# Patient Record
Sex: FEMALE | Race: WHITE | URBAN - METROPOLITAN AREA
[De-identification: names, ages, dates, MRNs, and addresses within clinical notes are randomized per-mention and may not be internally consistent; named-entity substitution may affect disease eponyms.]

---

## 2021-02-02 ENCOUNTER — HISTORICAL (OUTPATIENT)
Dept: ADMINISTRATIVE | Facility: HOSPITAL | Age: 26
End: 2021-02-02

## 2021-02-02 LAB — B-HCG FREE SERPL-ACNC: <2.42 MIU/ML

## 2024-10-09 DIAGNOSIS — M25.562 LEFT KNEE PAIN: Primary | ICD-10-CM

## 2024-11-07 ENCOUNTER — HOSPITAL ENCOUNTER (OUTPATIENT)
Dept: RADIOLOGY | Facility: HOSPITAL | Age: 29
Discharge: HOME | End: 2024-11-07
Attending: NURSE PRACTITIONER
Payer: MEDICAID

## 2024-11-07 ENCOUNTER — OFFICE VISIT (OUTPATIENT)
Dept: ORTHOPEDICS | Facility: CLINIC | Age: 29
End: 2024-11-07
Payer: MEDICAID

## 2024-11-07 VITALS
SYSTOLIC BLOOD PRESSURE: 116 MMHG | HEIGHT: 63 IN | TEMPERATURE: 98 F | BODY MASS INDEX: 22.19 KG/M2 | HEART RATE: 62 BPM | DIASTOLIC BLOOD PRESSURE: 72 MMHG | WEIGHT: 125.25 LBS

## 2024-11-07 DIAGNOSIS — M23.92 ACUTE INTERNAL DERANGEMENT OF LEFT KNEE: Primary | ICD-10-CM

## 2024-11-07 DIAGNOSIS — M62.552 ATROPHY OF QUADRICEPS FEMORIS MUSCLE OF LEFT THIGH: ICD-10-CM

## 2024-11-07 DIAGNOSIS — M25.562 ACUTE PAIN OF LEFT KNEE: ICD-10-CM

## 2024-11-07 PROCEDURE — 1160F RVW MEDS BY RX/DR IN RCRD: CPT | Mod: CPTII,,, | Performed by: NURSE PRACTITIONER

## 2024-11-07 PROCEDURE — 99205 OFFICE O/P NEW HI 60 MIN: CPT | Mod: S$PBB,,, | Performed by: NURSE PRACTITIONER

## 2024-11-07 PROCEDURE — 3078F DIAST BP <80 MM HG: CPT | Mod: CPTII,,, | Performed by: NURSE PRACTITIONER

## 2024-11-07 PROCEDURE — 3074F SYST BP LT 130 MM HG: CPT | Mod: CPTII,,, | Performed by: NURSE PRACTITIONER

## 2024-11-07 PROCEDURE — 73564 X-RAY EXAM KNEE 4 OR MORE: CPT | Mod: TC,LT

## 2024-11-07 PROCEDURE — 3008F BODY MASS INDEX DOCD: CPT | Mod: CPTII,,, | Performed by: NURSE PRACTITIONER

## 2024-11-07 PROCEDURE — 1159F MED LIST DOCD IN RCRD: CPT | Mod: CPTII,,, | Performed by: NURSE PRACTITIONER

## 2024-11-07 PROCEDURE — 99214 OFFICE O/P EST MOD 30 MIN: CPT | Mod: PBBFAC,25 | Performed by: NURSE PRACTITIONER

## 2024-11-07 RX ORDER — DICLOFENAC SODIUM 10 MG/G
4 GEL TOPICAL 3 TIMES DAILY
Qty: 100 G | Refills: 2 | Status: SHIPPED | OUTPATIENT
Start: 2024-11-07 | End: 2024-12-07

## 2024-11-07 NOTE — PROGRESS NOTES
"   [facility]  Subjective:   PATIENT ID: Karissa Higgins is a 29 y.o. female.   CHIEF COMPLAINT: Knee Pain of the Left Knee (Referred for Lt Knee pain . Pain Level 7. Pt says Its effecting the way she walks. Also C/o swelling down To ankle . Pt currently in PT.)    HPI:  Left knee pain medial stiffness and aching   Injury/ Surgical HX r/t CC:  Horse playing felt a pop anterior/ lateral knee with swelling, no brusing , inability to bear weight x 3 weeks    Onset: 7/21/24 fluctuates    Modifying Factors: exacerbated by:  increased activity, prolonged sitting, prolonged walking/ standing improved with:  movement in less than 30 minutes , rest   Associated Symptoms:  [x] joint locking  [x] joint catching  [x] decreased ROM  [x] crepitus [x] Weakness/ "giving out"  [] falls  [x] swelling  [x] difficult sleeping s/t pain        Activity: sedentary with light activity and pain moderately interferes with ADLs, assistive device none   Previous Treatments:  [x] HEP > 6 weeks  [x] RX PT 12 wks/ 3xs per week, completed n/a (currently in PT)   [x] Hinged brace since 7/31/24 at all times  [] Soft knee splint [x] OTC Pain Relievers: Tylenol, ibuprofen  [x] RX Medications: ibuprofen , po diclofenac, Tramadol, Norco, cyclobenzaprine, methocarbamol, Toradol   [x] CSI since 2024, last injection 10/2/24 (LOS) no relief at all  [] Hyaluronic acid injections   PMH:  non-smoker   Family History: + OA   NOTE:  New patient referred for left knee pain with noted history of some conservative treatments.  Symptoms affecting ADLs.  Reports feels like knee is "unstable." Denies back pain.  Has been under care of previous ortho provider and seeking 2nd opinion due to extended treatments without relief and patient is frustrated with lack of improvement.  Employment HX: nurse, currently employed.     No current outpatient medications on file.  Review of patient's allergies indicates: " "  Allergen Reactions    Clindamycin Rash    Penicillins Rash    Vancomycin analogues Rash    Zosyn in dextrose (iso-osm) [piperacillin-tazobactam-dextrs] Rash     REVIEW OF SYSTEMS:  A ten-point review of systems was performed and is negative, except as mentioned above   Objective:   Body mass index is 22.18 kg/m².   Vitals:    11/07/24 1041 11/07/24 1043   BP: 116/72    Pulse: 62    Temp: 98.4 °F (36.9 °C)    TempSrc: Oral    Weight: 56.8 kg (125 lb 3.5 oz)    Height: 5' 3" (1.6 m)    PainSc:    7     MSK Knee Exam  General:  no apparent distress, no pain indicators,  obesity  Inspection: lower extremities in proportion with overall body habitus, no erythema   ,  no erythema, limping gait w/ full weight full  LEFT KNEE RIGHT KNEE   [x] Swelling mild  [x] Varus deformity  [] Rash [] Contusion  [] Mass  [] Scars [] Swelling  [] Varus deformity  [] Rash [] Contusion  [] Mass  [] Scars   Palpation:     LEFT KNEE RIGHT KNEE   Joint Warmth: normal  POMT: diffuse pain    [] Patellar grind with compression  [] Crepitus  [] Joint effusion  [x] Quad atrophy  [] Active mechanical locking with joint movement  [] Popliteal fullness  [x] Tenderness medial joint line  [x] Tenderness lateral joint line  [x] Tenderness of tibial plateau   [x] Tenderness of patellar tendon  [x] Tenderness of quadriceps tendon  [x] Tenderness of prepatellar   [x] Tenderness of infrapatellar  [x] Tenderness of anserine bursae  [x] Tenderness of patellar facet  [x] Tenderness over lateral retinaculum Joint Warmth: normal  POMT: none  [] Patellar grind with compression  [] Crepitus  [] Joint effusion  [] Quad atrophy  [] Active mechanical locking with joint movement  [] Popliteal fullness  [] Tenderness medial joint line  [] Tenderness lateral joint line  [] Tenderness of tibial plateau   [] Tenderness of patellar tendon  [] Tenderness of quadriceps tendon  [] Tenderness of prepatellar  [] Tenderness of infrapatellar  [] Tenderness of anserine " "bursae  [] Tenderness of patellar facet  [] Tenderness over lateral retinaculum   ROM Active Flexion / Extension (0-140)  Left 1 / 114 w/ pain Right 0 / 130 w/o pain   Strength Flexion / Extension (5 / 5)  Left 3 / 5 Right 5 / 5     Special Testing:         Not  Tested IT Band Syndrome L+ L-- R+ R--    [] Fernando's Test [x] [] [] [x]     Joint Effusion        [] Ballotable Effusion [] [x] [] [x]    [] Fluid Wave [] [x] [] [x]     Patellar Testing        [] Apprehension [x] [] [] [x]     Meniscal Injury        [] Luis Fernando's Test [x] [] [] [x]    [x] Thessaly's Test [] [] [] []     Ligament Injury        [] ACL Anterior Drawer [] [x] [] [x]    [] PCL Posterior Drawer [] [x] [] [x]    [] LCL Varus Test [] [x] [] [x]    [] MCL Valgus Test [] [x] [] [x]      Hip Exam normal  Ankle Exam normal  Neurovascular: Intact to light touch  Neuro/ Psych: Awake, alert, oriented, normal mood and affect  Lymphatic: No LAD  Skin/ Soft Tissue: no rash, skin intact  Cardio: no edema, vascular integrity noted   Assessment:   IMAGING:  XR Ordered by me today 4 views of left knee reviewed and independently interpreted by me with noted no acute findings noted, findings suggestive of arthritic changes, medial joint space narrowing.  Awaiting radiologist findings.  Findings discussed with patient today.    MRI/CT:   Dated 7/26/24 by [facility]:  Minimal increased T2 signal/edema, perhaps representing minimal contusion along the posterior aspect of the medial tibial plateau.  Diffuse thinning of ACL fibers which is largely chronic although there may be some superimposed strain.  Focal soft tissue swelling/edema over the medial aspect of the distal joint capsule.    LABS: No results found for: "HGBA1C"  EMR REVIEW: completed with noted Referral documentation reviewed  Diagnosis & Treatment Plan:   DIAGNOSIS: Acute on Chronic  Left  knee ACL sprain (chronic) with possible internal derangement with mechanical locking complicated by pan-positive " testing and examination with quad atrophy and gait dysfunction from prolonged hinged bracing despite 12 weeks of RX PT from previous ortho provider.     1. Acute internal derangement of left knee    2. Atrophy of quadriceps femoris muscle of left thigh    3. BMI 22.0-22.9, adult      TREATMENT PLAN:  Orders Placed This Encounter    HME - OTHER    X-Ray Knee Complete 4 or More Views Left     Treatment plan:    Extensive time spent reviewing previous MRI results and carefully explaining findings to ensure patient understands DX, treatment plan and need for improvement in secondary symptoms like quad atrophy and gait dysfunction in order to improve symptoms.  Verbalized understanding, agrees to plan and denies questions.   MRI ordered s/t failed conservative treatments including: RX NSAID, formal RX PT, CSI, and HEP > 3 months with inadequate relief.  Patient continues with findings suggestive of meniscal injury with mechanical locking despite > 6 weeks treatment.  MRI required for definitive DX and possible surgical treatment plans.   RX PT  with controlled, progressive tendon loading and gradual resumption of activity, instructed to contact insurance provider in order to obtain provider which takes patient's insurance.  Patient encouraged to find new PT provider if current provider not making progress with LE strength.   Recommended patient wean out of hinged knee brace into soft knee splint over 7-1o days.   Ongoing education about DX, treatment recommendations and reasonable expectations including goal to decrease pain and increase function  Conservative treatments including, but limited to:  activity modification as needed, daily HEP with TheraBand, proper supportive footwear, non-impact muscle strengthening with use of stationary bike (RPM set at 80 or > with slow progression to goal of 40 minutes 3-4 times per week as tolerated), walking-aides as needed, adequate vit D/C, glucosamine 1500 mg/day and/or daily  acetaminophen 1000 mg 3 times/ day if able to tolerate.    Weight management is paramount. Immediate BMI reduction goal 5-10% of body weight.  Achieving a BMI < 25 would be optimal for overall health and symptoms relief.   Patient aware condition has no cure and treatment plan is focused on management of symptoms.  Procedure: n/a  RX Medications: continue medications as RX per PCP.  RX topical diclofenac, medication precautions given.   RTC:  after imaging complete  NOTE: None    Leah Menard-Neumann FNP [facility]  Procedure Note:   None  Time Based Billing   Total Time Spent with Patient: 60 minutes Excludes Time Spent Performing Procedure  Visit Start Time: 1130  10 minutes spent prior to visit reviewing EMR, prior labs and x-rays  35 minutes spent in visit with patient face-to-face time completing exam, obtaining history, educating on DX and treatment plan.  15 minutes spent after visit completing EMR documentation.   Visit End Time: 1230     *Please be aware that this note has been generated with the assistance of MModal voice-to-text.  Please excuse any spelling or grammatical errors. Positive findings indicted by checkmark*

## 2024-11-25 ENCOUNTER — TELEPHONE (OUTPATIENT)
Dept: ORTHOPEDICS | Facility: CLINIC | Age: 29
End: 2024-11-25
Payer: MEDICAID

## 2024-11-25 NOTE — TELEPHONE ENCOUNTER
----- Message from Guadalupe Ayers NP sent at 11/25/2024  8:30 AM CST -----  MRI reviewed, schedule on Monday 12/2/24 for telemedicine visit for MRI results. Thanks

## 2024-12-02 ENCOUNTER — OFFICE VISIT (OUTPATIENT)
Dept: ORTHOPEDICS | Facility: CLINIC | Age: 29
End: 2024-12-02
Payer: MEDICAID

## 2024-12-02 VITALS — HEIGHT: 63 IN | BODY MASS INDEX: 23.92 KG/M2 | WEIGHT: 135 LBS

## 2024-12-02 DIAGNOSIS — M62.552 ATROPHY OF QUADRICEPS FEMORIS MUSCLE OF LEFT THIGH: Primary | ICD-10-CM

## 2024-12-02 PROCEDURE — 1159F MED LIST DOCD IN RCRD: CPT | Mod: CPTII,95,, | Performed by: NURSE PRACTITIONER

## 2024-12-02 PROCEDURE — 99214 OFFICE O/P EST MOD 30 MIN: CPT | Mod: 95,,, | Performed by: NURSE PRACTITIONER

## 2024-12-02 PROCEDURE — 1160F RVW MEDS BY RX/DR IN RCRD: CPT | Mod: CPTII,95,, | Performed by: NURSE PRACTITIONER

## 2024-12-02 PROCEDURE — 3008F BODY MASS INDEX DOCD: CPT | Mod: CPTII,95,, | Performed by: NURSE PRACTITIONER

## 2024-12-02 NOTE — PROGRESS NOTES
"   [facility]  Subjective:   Telemedicine Consent  The patient location is: Home  The chief complaint leading to consultation is: left knee pain  Visit type: Face-to-face video  30 minutes of total time spent on the encounter, which includes face-to-face time and non-face-to-face time preparing to see the patient (eg. review of tests), obtaining and/or reviewing separately obtained history, documenting clinical information in the electronic or other health record, independently interpreting results (not separately reported) and communicating results to the patient/family/caregiver or care coordination (not separately reported).   I have reviewed patient's name,  and picture ID if applicable.  I discussed with patient regarding medical services by telemedicine (1) informed of the relationship between the physician and patient and the respective role of any other health care provider with respect to management of the patient (2) session are not recorded and personal health information is protected; and (3) notified that he or she may decline to receive medical services by telemedicine and may withdraw from such care at any time.  Patient consented to consult by telemedicine.     PATIENT ID: Karissa Higgins is a 29 y.o. female.   CHIEF COMPLAINT: Knee Pain of the Left Knee (Mri Results Lt Knee . Pain level 8 and Constant)    HPI:  Left knee pain diffuse stiffness and aching, tingling, burning   Injury/ Surgical HX r/t CC:  Horse playing felt a pop anterior/ lateral knee with swelling, no brusing , inability to bear weight x 3 weeks    Onset: 24 fluctuates    Modifying Factors: exacerbated by:  increased activity, prolonged sitting, prolonged walking/ standing improved with:  movement in less than 30 minutes , rest   Associated Symptoms:  [x] joint locking  [x] joint catching  [x] decreased ROM  [x] crepitus [x] Weakness/ "giving out"  [] falls  [x] " "swelling  [x] difficult sleeping s/t pain        Activity: sedentary with light activity and pain moderately interferes with ADLs, assistive device none   Previous Treatments:  [x] HEP > 6 weeks  [x] RX PT 12 wks/ 3xs per week, completed n/a (currently in PT)   [x] Hinged brace since 7/31/24 at all times  [x] Soft knee splint [x] OTC Pain Relievers: Tylenol, ibuprofen  [x] RX Medications: ibuprofen , po diclofenac, Tramadol, Norco, cyclobenzaprine, methocarbamol, Toradol   [x] CSI since 2024, last injection 10/2/24 ([facility]) no relief at all  [] Hyaluronic acid injections   PMH:  non-smoker   Family History: + OA   NOTE: Follow up, seen by me since 2024  for left knee with LE atrophy s/t prolonged hinged brace use, c/o mechanical locking and diffuse symptoms complicated by pan-positive testing.   At initial visit RX PT recommended and reports is currently in PT with some improvement in weakness but continues with diffuse pain.  Patient had MRI in July 2024, requested 2nd MRI 11/7/24 due to feeling like the first MRI "missed something,"  and her lack in improvement in symptoms. Continues to reports feeling knee is "unstable" and recent onset of right knee pain similar to left. Denies back pain, hip and/or ankle pain.  Has been under care of previous ortho provider at [facility] and was told nothing more they could do for her, came to [facility] ortho seeking 2nd opinion.   Here today for MRI results.   Employment HX: nurse, currently employed.       Current Outpatient Medications:     diclofenac sodium (VOLTAREN) 1 % Gel, Apply 4 g topically 3 (three) times daily. (Patient not taking: Reported on 12/2/2024), Disp: 100 g, Rfl: 2  Review of patient's allergies indicates:   Allergen Reactions    Clindamycin Rash    Penicillins Rash    Vancomycin analogues Rash    Zosyn in dextrose (iso-osm) [piperacillin-tazobactam-dextrs] Rash     REVIEW OF SYSTEMS:  A ten-point review of systems was performed and is negative, except as mentioned above " "  Objective:   Body mass index is 23.91 kg/m².   Vitals:    12/02/24 1120   Weight: 61.2 kg (135 lb)   Height: 5' 3" (1.6 m)   PainSc:   8     N/a telemedicine visit   Assessment:   IMAGING:  XR noted in EMR dated 11/7/24  4 views of left knee reviewed and independently interpreted by me with noted no acute findings noted.  Radiologist findings reviewed.  Findings discussed with patient today.    EXAMINATION: XR KNEE COMP 4 OR MORE VIEWS LEFT 11/7/24  CLINICAL HISTORY:  Pain in left knee  TECHNIQUE:  AP, Lateral, Sunrise and Tunnel views of the left knee were preformed  COMPARISON:  None  FINDINGS:  No acute displaced fracture, dislocation or osseous destructive process identified.  Joint spaces are maintained.  No definite left knee effusion.  No radiopaque retained foreign body.  Impression:  No acute osseous abnormality identified.    MRI/CT:   Dated 7/26/24 by [facility]:  Minimal increased T2 signal/edema, perhaps representing minimal contusion along the posterior aspect of the medial tibial plateau.  Diffuse thinning of ACL fibers which is largely chronic although there may be some superimposed strain.  Focal soft tissue swelling/edema over the medial aspect of the distal joint capsule.    EXAMINATION: MRI KNEE WITHOUT CONTRAST LEFT 11/22/24  CLINICAL HISTORY:  29-year-old woman with chronic left knee pain.  Unspecified internal derangement of left knee  TECHNIQUE:  Axial T2 fat sat, sagittal T2 fat sat, sagittal PD, coronal PD fat sat, coronal T1, and thin slice axial T2 fat-sat meniscal non-contrast 1.5T magnetic resonance imaging was performed through the left knee per routine protocol.  COMPARISON:  MRI left knee without contrast 07/26/2024.  Correlation is made with left knee radiographs of 11/07/2024.  FINDINGS:  There is no joint effusion.  A thin, trace popliteal fossa Baker's type cyst extending between the distal semimembranosus and medial head gastrocnemius tendons measures only 1.9 x  0.2 x 2.5 " "cm with no surrounding edema.  There is mild attenuation of the central weight-bearing medial femoral condyle articular cartilage.  No focal cartilage abnormality.  The patellofemoral and lateral femorotibial articular cartilage is normal.  There is no subchondral marrow signal abnormality, osteophyte formation, or osteochondral body.  The ACL and PCL are intact and normal.  The medial meniscus and lateral meniscus are also both intact and normal.  The medial and lateral ligaments and tendons are all normal in course, morphology, and signal characteristics.  The quadriceps tendon and patellofemoral retinacular complexes are normal and there is no abnormality at the quadriceps fat pad.  The patellar tendon and Hoffa's fat pad are normal.  There is no bursitis or hematoma.  There is no muscle edema or atrophy.  The proximal tibiofibular joint is normal.  There is no marrow signal abnormality or osseous lesion.  Alignment at the knee is anatomic and there is no acute or chronic fracture.  Impression:  No acute pathology identified at the left knee.  Mild attenuation of the central weight-bearing medial femoral condyle articular cartilage without focal cartilage defect.  Trace, thin popliteal fossa Baker's type cyst.    LABS: No results found for: "HGBA1C"  EMR REVIEW: completed with noted prior visit 11/7/24 reviewed.  Diagnosis & Treatment Plan:   DIAGNOSIS: Moderate exacerbation of chronic Left  knee pain w/o known cause, complicated by pan-positive testing, quad atrophy and gait dysfunction s/t prolonged hinged bracing     1. Atrophy of quadriceps femoris muscle of left thigh    2. BMI 23.0-23.9, adult      TREATMENT PLAN:     Treatment plan:    Extended time spent discussing MRI findings and educating patient on treatment options.  Discussed non-surgical treatments including RX PT for symptoms relief.  Patient desires surgical treatment options at this time.  Appointment with orthopedic surgeon residents for " evaluation will be scheduled. Patient is aware I am not guaranteeing surgery. Determination of appropriateness for surgery will be made by surgeon at evaluation.   Ongoing education about DX, treatment recommendations and reasonable expectations including goal to decrease pain and increase function  Conservative treatments including, but limited to:  activity modification as needed, daily HEP with TheraBand, proper supportive footwear, non-impact muscle strengthening with use of stationary bike (RPM set at 80 or > with slow progression to goal of 40 minutes 3-4 times per week as tolerated), walking-aides as needed, adequate vit D/C, glucosamine 1500 mg/day and/or daily acetaminophen 1000 mg 3 times/ day if able to tolerate.    Weight management is paramount. Immediate BMI reduction goal 5-10% of body weight.  Achieving a BMI < 25 would be optimal for overall health and symptoms relief.   Patient aware condition has no cure and treatment plan is focused on management of symptoms.  Procedure: n/a  RX Medications: continue medications as RX per PCP.    RTC:  next available appt. with ortho res.   NOTE: None    Leah Menard-Neumann FNP [facility]  Procedure Note:   None  Time Based Billing   Total Time Spent with Patient: 30 minutes  Visit Start Time: 1130  10 minutes spent prior to visit reviewing EMR, prior labs and x-rays.  10 minutes spent in audio only visit with patient for medical discussion, obtaining history, educating on DX and treatment plan.  10 minutes spent after visit completing EMR documentation.   Visit End Time: 1200    *Please be aware that this note has been generated with the assistance of MModal voice-to-text.  Please excuse any spelling or grammatical errors. Positive findings indicted by checkmark*

## 2024-12-11 ENCOUNTER — CLINICAL SUPPORT (OUTPATIENT)
Dept: ORTHOPEDICS | Facility: CLINIC | Age: 29
End: 2024-12-11
Payer: MEDICAID

## 2024-12-11 VITALS
SYSTOLIC BLOOD PRESSURE: 119 MMHG | HEIGHT: 63 IN | WEIGHT: 127.63 LBS | OXYGEN SATURATION: 98 % | RESPIRATION RATE: 20 BRPM | BODY MASS INDEX: 22.61 KG/M2 | DIASTOLIC BLOOD PRESSURE: 66 MMHG | TEMPERATURE: 98 F | HEART RATE: 86 BPM

## 2024-12-11 DIAGNOSIS — M25.562 LEFT KNEE PAIN, UNSPECIFIED CHRONICITY: Primary | ICD-10-CM

## 2024-12-11 DIAGNOSIS — M23.92 ACUTE INTERNAL DERANGEMENT OF LEFT KNEE: ICD-10-CM

## 2024-12-11 PROCEDURE — 99213 OFFICE O/P EST LOW 20 MIN: CPT | Mod: PBBFAC

## 2024-12-11 NOTE — PROGRESS NOTES
[facility]  Established Patient Office Visit  12/11/2024       Patient ID: Karissa Higgins  YOB: 1995  MRN: [id number]    Chief Complaint: Injury, Pain, and Swelling of the Left Knee (Injured knee 07/2024, MRI done, knee sleeve on, PT 3x week without improvement pain 8/10 does not take anything for pain, has had knee injection x 1 )      HPI:  Karissa Higgins is a 29 y.o. female who sustained a left knee injury in July of 2024 while rough playing.  She reports feeling a popping sensation at the time of injury and was unable to ambulate for 3 weeks afterwards.  She was previously been evaluated by outside orthopedic surgeon in sports medicine- presents today for 2nd opinion.  MRI of the left knee without evidence of ACL/PCL or meniscal pathology.  Patient has been in physical therapy since August and reports minimal improvement in her symptoms.  Reports continued pain, swelling and feelings of instability in her left knee.  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  Denies any interval falls or trauma.  Was in a hinged knee brace for a considerable amount of time and now wearing a knee sleeve.    12 point ROS performed and negative except as above.     Past Medical History:    History reviewed. No pertinent past medical history.  Past Surgical History:   Procedure Laterality Date    ADENOIDECTOMY      HYSTERECTOMY      TONSILLECTOMY       Family History   Problem Relation Name Age of Onset    Arthritis Maternal Grandfather Fahad higgins     COPD Maternal Grandfather Fahad arangoalle     Early death Maternal Grandfather Fahad arangoalle     Heart disease Maternal Grandfather Fahad arangoalle     Arthritis Maternal Grandmother Verónica balbir     Cancer Maternal Grandmother Verónica balbir     Depression Maternal Grandmother Verónica balbir     Early death Maternal Grandmother Verónica balbir     Stroke Maternal Grandmother Verónica balbir       Social History     Socioeconomic History    Marital status: Single   Tobacco Use    Smoking status: Never    Smokeless tobacco: Never   Substance and Sexual Activity    Alcohol use: Never    Drug use: Never    Sexual activity: Yes     Partners: Male     Birth control/protection: See Surgical Hx, None     Medication List with Changes/Refills   Current Medications    DICLOFENAC SODIUM (VOLTAREN) 1 % GEL    Apply 4 g topically 3 (three) times daily.     Review of patient's allergies indicates:   Allergen Reactions    Clindamycin Rash    Penicillins Rash    Vancomycin analogues Rash    Zosyn in dextrose (iso-osm) [piperacillin-tazobactam-dextrs] Rash       Physical Exam:  Left Lower extremity:  Skin atraumatic   Minimal to no effusion about knee  Tenderness to palpation medial and lateral joint line  ROM see address 130° of flexion  Stable anterior/posterior  Lachman 1A   Significant pain with varus and valgus stress  Mildly positive patellar apprehension  No significantly increased laxity/lateral patellar translation  Luis Fernando's equivocal  Negative pivot shift  No groin pain with rotation of hip  Motor intact TA/GS/EHL/FHL  SILT SP/DP/Sa/Pratt/T  Toes WWP    Imaging independently interpreted:  Previous MRI of the left knee demonstrates no evidence of ACL or PCL tear.  Meniscus intact.    Assessment and Plan:    Karsisa Higgins is a 29 y.o. female continued left knee pain following left knee injury in July of 2024.  Symptoms consistent with patellofemoral syndrome.    -had a long discussion with the patient regarding her left knee symptoms.  Given there was no evidence acute pathology on MRI recommend continued nonoperative management at this time.  - continue physical therapy new referral provided to work on quad strengthening, hamstring stretching  - Rx for patellar J brace provided  - recommend topical diclofenac gel to help with inflammation  - ice and elevate as needed  - follow up in 3 months for repeat  evaluation    Liza Rea MD PGY-3  [employer] Orthopaedic Surgery

## 2024-12-11 NOTE — PROGRESS NOTES
Faculty Attestation: Karissa Higgins  was seen at [facility] in the Orthopaedic Clinic. Discussed with the resident at the time of the visit. History of Present Illness, Physical Exam, and Assessment and Plan reviewed. Treatment plan is reasonable and appropriate. Compliance with treatment recommendations is important. No procedure was performed.     Dylon Martinez MD  Orthopaedic Surgery

## 2025-02-26 ENCOUNTER — OFFICE VISIT (OUTPATIENT)
Dept: ORTHOPEDICS | Facility: CLINIC | Age: 30
End: 2025-02-26
Payer: MEDICAID

## 2025-02-26 VITALS
WEIGHT: 126.56 LBS | SYSTOLIC BLOOD PRESSURE: 122 MMHG | BODY MASS INDEX: 22.43 KG/M2 | HEIGHT: 63 IN | OXYGEN SATURATION: 99 % | DIASTOLIC BLOOD PRESSURE: 73 MMHG | HEART RATE: 67 BPM

## 2025-02-26 DIAGNOSIS — M25.562 LEFT KNEE PAIN, UNSPECIFIED CHRONICITY: Primary | ICD-10-CM

## 2025-02-26 PROCEDURE — 99213 OFFICE O/P EST LOW 20 MIN: CPT | Mod: PBBFAC

## 2025-02-26 PROCEDURE — 3074F SYST BP LT 130 MM HG: CPT | Mod: CPTII,,, | Performed by: ORTHOPAEDIC SURGERY

## 2025-02-26 PROCEDURE — 99213 OFFICE O/P EST LOW 20 MIN: CPT | Mod: S$PBB,,, | Performed by: ORTHOPAEDIC SURGERY

## 2025-02-26 PROCEDURE — 1159F MED LIST DOCD IN RCRD: CPT | Mod: CPTII,,, | Performed by: ORTHOPAEDIC SURGERY

## 2025-02-26 PROCEDURE — 3078F DIAST BP <80 MM HG: CPT | Mod: CPTII,,, | Performed by: ORTHOPAEDIC SURGERY

## 2025-02-26 PROCEDURE — 3008F BODY MASS INDEX DOCD: CPT | Mod: CPTII,,, | Performed by: ORTHOPAEDIC SURGERY

## 2025-02-26 PROCEDURE — 96372 THER/PROPH/DIAG INJ SC/IM: CPT | Mod: PBBFAC

## 2025-02-26 RX ORDER — LIDOCAINE HYDROCHLORIDE 10 MG/ML
5 INJECTION, SOLUTION EPIDURAL; INFILTRATION; INTRACAUDAL; PERINEURAL
Status: COMPLETED | OUTPATIENT
Start: 2025-02-26 | End: 2025-02-26

## 2025-02-26 RX ORDER — TRIAMCINOLONE ACETONIDE 40 MG/ML
40 INJECTION, SUSPENSION INTRA-ARTICULAR; INTRAMUSCULAR
Status: COMPLETED | OUTPATIENT
Start: 2025-02-26 | End: 2025-02-26

## 2025-02-26 RX ADMIN — LIDOCAINE HYDROCHLORIDE 50 MG: 10 INJECTION, SOLUTION EPIDURAL; INFILTRATION; INTRACAUDAL; PERINEURAL at 11:02

## 2025-02-26 RX ADMIN — TRIAMCINOLONE ACETONIDE 40 MG: 40 INJECTION, SUSPENSION INTRA-ARTICULAR; INTRAMUSCULAR at 11:02

## 2025-02-26 NOTE — PROGRESS NOTES
[facility]  Established Patient Office Visit  02/26/2025       Patient ID: Karissa Higgins  YOB: 1995  MRN: [id number]    Chief Complaint: Follow-up of the Left Knee (F/U Right Patella Femoral Syndrome-)      HPI:  Karissa Higgins is a 30 y.o. female who sustained a left knee injury in July of 2024 while rough playing.  She reports feeling a popping sensation at the time of injury and was unable to ambulate for 3 weeks afterwards.  She was previously been evaluated by outside orthopedic surgeon in sports medicine- presents today for 2nd opinion.  MRI of the left knee without evidence of ACL/PCL or meniscal pathology.  Patient has been in physical therapy since August and reports minimal improvement in her symptoms.  Reports continued pain, swelling and feelings of instability in her left knee.  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  Denies any interval falls or trauma.  Was in a hinged knee brace for a considerable amount of time and now wearing a knee sleeve.    Interval 02/26/2025   Patient has continued doing physical therapy without much relief.  She still has pain.  She does note a painful popping sensation.  She has been doing the peripheral while and also states she had 1 injection back in September that did not provide much relief.  She says the pain is located deep in the front of the knee.    12 point ROS performed and negative except as above.     Past Medical History:    History reviewed. No pertinent past medical history.  Past Surgical History:   Procedure Laterality Date    ADENOIDECTOMY      HYSTERECTOMY      TONSILLECTOMY       Family History   Problem Relation Name Age of Onset    Arthritis Maternal Grandfather Fahad higgins     COPD Maternal Grandfather Fahad higgins     Early death Maternal Grandfather Fahad arangoalle     Heart disease Maternal Grandfather Fahad arangoalle     Arthritis Maternal Grandmother Verónica  balbir     Cancer Maternal Grandmother Verónica balbir     Depression Maternal Grandmother Verónicasa desaie     Early death Maternal Grandmother Verónica balbir     Stroke Maternal Grandmother Verónica balbir      Social History     Socioeconomic History    Marital status: Single   Tobacco Use    Smoking status: Never    Smokeless tobacco: Never   Substance and Sexual Activity    Alcohol use: Never    Drug use: Never    Sexual activity: Yes     Partners: Male     Birth control/protection: See Surgical Hx, None     Medication List with Changes/Refills   Current Medications    DICLOFENAC SODIUM (VOLTAREN) 1 % GEL    Apply 4 g topically 3 (three) times daily.     Review of patient's allergies indicates:   Allergen Reactions    Clindamycin Rash    Penicillins Rash    Vancomycin analogues Rash    Zosyn in dextrose (iso-osm) [piperacillin-tazobactam-dextrs] Rash       Physical Exam:  Left Lower extremity:  Skin atraumatic   Minimal to no effusion about knee  Tenderness to palpation medial and lateral joint line  ROM see address 130° of flexion  Stable anterior/posterior  Lachman 1A   Stable varus valgus stress  No patellar apprehension   No significant laxity with patellar translation  Luis Fernando's equivocal  Negative pivot shift  No groin pain with rotation of hip  Motor intact TA/GS/EHL/FHL  SILT SP/DP/Sa/Pratt/T  Toes WWP    Imaging independently interpreted:  Previous MRI of the left knee demonstrates no evidence of ACL or PCL tear.  Meniscus intact.  No significant cartilage wear.  Minimal attenuation medial femoral condyle.    Assessment and Plan:    Karissa Higgins is a 30 y.o. female continued left knee pain following left knee injury in July of 2024 with pain consistent with patellofemoral pain syndrome.  Patient of the doing physical therapy for many months without much relief.  No evidence of distinct injury on MRI but continues to have pain with therapy.    We discussed potential continued nonoperative versus  operative treatment options.  At this point we will try another knee injection today.  She will come back in about 3 months if this is not provide much relief.  We did discuss potential for a knee arthroscopy if she continues to have pain after the injection given that she would have failed multiple injections and extensive physical therapy however we do not see any evidence of distinct injury on MRI and we did discuss that we would not guarantee relief of her pain with this knee scope.     Procedure note   The left knee was prepped in sterile fashion after consent was obtained.  The left knee was injected with 22 gauge needle with a 5 cc lidocaine 1% and 1 cc Kenalog.  Patient tolerated the procedure well.    Huber Day MD PGY-5  [employer] [facility]

## 2025-02-26 NOTE — PROGRESS NOTES
Faculty Attestation: Karissa Higgins  was seen at [facility] in the Orthopaedic Clinic. Patient seen and evaluated at the time of the visit. History of Present Illness, Physical Exam, and Assessment and Plan reviewed. Treatment plan is reasonable and appropriate. Compliance with treatment recommendations is important. Procedure note reviewed. Present for entire procedure with the resident. Patient tolerated procedure well.      Huber Cramer MD  Orthopaedic Surgery

## 2025-06-04 ENCOUNTER — HOSPITAL ENCOUNTER (OUTPATIENT)
Dept: RADIOLOGY | Facility: HOSPITAL | Age: 30
Discharge: HOME | End: 2025-06-04
Payer: MEDICAID

## 2025-06-04 ENCOUNTER — OFFICE VISIT (OUTPATIENT)
Dept: ORTHOPEDICS | Facility: CLINIC | Age: 30
End: 2025-06-04
Payer: MEDICAID

## 2025-06-04 VITALS
SYSTOLIC BLOOD PRESSURE: 111 MMHG | DIASTOLIC BLOOD PRESSURE: 75 MMHG | RESPIRATION RATE: 20 BRPM | TEMPERATURE: 98 F | HEART RATE: 73 BPM | HEIGHT: 63 IN | BODY MASS INDEX: 19.61 KG/M2 | OXYGEN SATURATION: 99 % | WEIGHT: 110.69 LBS

## 2025-06-04 DIAGNOSIS — M25.552 LEFT HIP PAIN: Primary | ICD-10-CM

## 2025-06-04 DIAGNOSIS — M25.562 LEFT KNEE PAIN, UNSPECIFIED CHRONICITY: ICD-10-CM

## 2025-06-04 DIAGNOSIS — M25.552 LEFT HIP PAIN: ICD-10-CM

## 2025-06-04 PROCEDURE — 99215 OFFICE O/P EST HI 40 MIN: CPT | Mod: PBBFAC,25

## 2025-06-04 PROCEDURE — 73502 X-RAY EXAM HIP UNI 2-3 VIEWS: CPT | Mod: TC,LT

## 2025-06-04 RX ORDER — SODIUM CHLORIDE 9 MG/ML
INJECTION, SOLUTION INTRAVENOUS CONTINUOUS
OUTPATIENT
Start: 2025-06-04

## 2025-06-04 RX ORDER — TIZANIDINE HYDROCHLORIDE 4 MG/1
1 CAPSULE ORAL
COMMUNITY
Start: 2025-05-27

## 2025-06-04 RX ORDER — MUPIROCIN 20 MG/G
OINTMENT TOPICAL
OUTPATIENT
Start: 2025-06-04

## 2025-06-04 RX ORDER — CEFAZOLIN SODIUM 2 G/50ML
2 SOLUTION INTRAVENOUS
OUTPATIENT
Start: 2025-06-04

## 2025-06-04 RX ORDER — SULINDAC 200 MG/1
200 TABLET ORAL EVERY 12 HOURS PRN
COMMUNITY
Start: 2025-05-29

## 2025-06-20 RX ORDER — METHIMAZOLE 5 MG/1
5 TABLET ORAL DAILY
COMMUNITY

## 2025-06-20 RX ORDER — PANTOPRAZOLE SODIUM 40 MG/1
40 TABLET, DELAYED RELEASE ORAL DAILY
COMMUNITY

## 2025-07-07 ENCOUNTER — ANESTHESIA EVENT (OUTPATIENT)
Dept: SURGERY | Facility: HOSPITAL | Age: 30
End: 2025-07-07
Payer: MEDICAID

## 2025-07-07 NOTE — ANESTHESIA PREPROCEDURE EVALUATION
"Karissa Higgins is a 30 y.o. female presenting for ARTHROSCOPY, KNEE, DIAGNOSTIC (Left: Knee) with a history of   -Left knee pain    Vitals:    07/10/25 0532 07/10/25 0552 07/10/25 0612   BP: (!) 131/90  (!) 129/99   BP Location: Left arm     Patient Position: Lying     Pulse: (!) 58  62   Resp: 18  20   Temp: 36.6 °C (97.8 °F)  36 °C (96.8 °F)   TempSrc: Oral  Temporal   SpO2: 100%  100%   Weight:  47.7 kg (105 lb 3.2 oz)        -s/p hysterectomy  -GERD    BETA-BLOCKER: none    New Orders for Anesthesia: none    Problem List[1]    Past Surgical History:   Procedure Laterality Date    ADENOIDECTOMY      HYSTERECTOMY      TONSILLECTOMY         No results found for: "WBC", "HGB", "HCT", "PLT"    CMP  No results found for: "NA", "K", "CL", "CO2", "GLU", "BUN", "CREATININE", "CALCIUM", "PROT", "ALBUMIN", "BILITOT", "ALKPHOS", "AST", "ALT", "ANIONGAP", "EGFRNORACEVR"        Current Outpatient Medications   Medication Instructions    diclofenac sodium (VOLTAREN) 4 g, Topical (Top), 3 times daily    methIMAzole (TAPAZOLE) 5 mg, Daily    pantoprazole (PROTONIX) 40 mg, Daily    sulindac (CLINORIL) 200 mg, Every 12 hours PRN    ZANAFLEX 4 mg Cap 1 capsule           Pre-op Assessment    I have reviewed the Patient Summary Reports.     I have reviewed the Nursing Notes. I have reviewed the NPO Status.   I have reviewed the Medications.     Review of Systems  Anesthesia Hx:  No problems with previous Anesthesia   History of prior surgery of interest to airway management or planning:          Denies Family Hx of Anesthesia complications.    Denies Personal Hx of Anesthesia complications.                    Hematology/Oncology:  Hematology Normal   Oncology Normal                                   EENT/Dental:  EENT/Dental Normal           Cardiovascular:  Cardiovascular Normal                                              Pulmonary:  Pulmonary Normal                       Renal/:  Renal/ Normal               "   Hepatic/GI:  Hepatic/GI Normal                    Musculoskeletal:  Musculoskeletal Normal                Neurological:  Neurology Normal                                      Endocrine:  Endocrine Normal            Dermatological:  Skin Normal    Psych:  Psychiatric Normal                    Physical Exam  General: Well nourished, Cooperative, Alert and Oriented    Airway:  Mallampati: I / I  Mouth Opening: Normal  TM Distance: Normal  Tongue: Normal  Neck ROM: Normal ROM    Dental:  Intact        Anesthesia Plan  Type of Anesthesia, risks & benefits discussed:    Anesthesia Type: Gen Supraglottic Airway  Intra-op Monitoring Plan: Standard ASA Monitors  Post Op Pain Control Plan: multimodal analgesia, IV/PO Opioids PRN and peripheral nerve block  Induction:  IV  Airway Plan: Direct  Informed Consent: Informed consent signed with the Patient and all parties understand the risks and agree with anesthesia plan.  All questions answered. Patient consented to blood products? No  ASA Score: 2  Day of Surgery Review of History & Physical: H&P Update referred to the surgeon/provider.    Ready For Surgery From Anesthesia Perspective.     .           [1]   Patient Active Problem List  Diagnosis    Atrophy of quadriceps femoris muscle of left thigh    Acute internal derangement of left knee    BMI 23.0-23.9, adult

## 2025-07-09 RX ORDER — GABAPENTIN 300 MG/1
300 CAPSULE ORAL 3 TIMES DAILY
Qty: 90 CAPSULE | Refills: 0 | Status: SHIPPED | OUTPATIENT
Start: 2025-07-09 | End: 2026-07-09

## 2025-07-09 RX ORDER — TRAMADOL HYDROCHLORIDE 50 MG/1
50 TABLET, FILM COATED ORAL EVERY 8 HOURS PRN
Qty: 20 EACH | Refills: 0 | Status: SHIPPED | OUTPATIENT
Start: 2025-07-09 | End: 2025-07-16

## 2025-07-09 RX ORDER — ONDANSETRON 4 MG/1
4 TABLET, FILM COATED ORAL EVERY 8 HOURS PRN
Qty: 15 TABLET | Refills: 0 | Status: SHIPPED | OUTPATIENT
Start: 2025-07-09 | End: 2025-07-16

## 2025-07-09 RX ORDER — MELOXICAM 15 MG/1
15 TABLET ORAL DAILY
Qty: 14 TABLET | Refills: 0 | Status: SHIPPED | OUTPATIENT
Start: 2025-07-09

## 2025-07-09 RX ORDER — ACETAMINOPHEN 500 MG
1000 TABLET ORAL EVERY 8 HOURS
Qty: 90 TABLET | Refills: 0 | Status: SHIPPED | OUTPATIENT
Start: 2025-07-09

## 2025-07-10 ENCOUNTER — ANESTHESIA (OUTPATIENT)
Dept: SURGERY | Facility: HOSPITAL | Age: 30
End: 2025-07-10
Payer: MEDICAID

## 2025-07-10 ENCOUNTER — HOSPITAL ENCOUNTER (OUTPATIENT)
Facility: HOSPITAL | Age: 30
Discharge: HOME | End: 2025-07-10
Attending: ORTHOPAEDIC SURGERY | Admitting: ORTHOPAEDIC SURGERY
Payer: MEDICAID

## 2025-07-10 VITALS
RESPIRATION RATE: 20 BRPM | OXYGEN SATURATION: 100 % | BODY MASS INDEX: 18.64 KG/M2 | SYSTOLIC BLOOD PRESSURE: 111 MMHG | DIASTOLIC BLOOD PRESSURE: 79 MMHG | WEIGHT: 105.19 LBS | HEART RATE: 76 BPM | TEMPERATURE: 98 F

## 2025-07-10 DIAGNOSIS — M62.552 ATROPHY OF QUADRICEPS FEMORIS MUSCLE OF LEFT THIGH: ICD-10-CM

## 2025-07-10 DIAGNOSIS — M25.552 LEFT HIP PAIN: ICD-10-CM

## 2025-07-10 DIAGNOSIS — M23.92 ACUTE INTERNAL DERANGEMENT OF LEFT KNEE: Primary | ICD-10-CM

## 2025-07-10 DIAGNOSIS — M25.562 LEFT KNEE PAIN, UNSPECIFIED CHRONICITY: ICD-10-CM

## 2025-07-10 PROCEDURE — 27201423 OPTIME MED/SURG SUP & DEVICES STERILE SUPPLY: Performed by: ORTHOPAEDIC SURGERY

## 2025-07-10 PROCEDURE — 63600175 PHARM REV CODE 636 W HCPCS: Performed by: NURSE ANESTHETIST, CERTIFIED REGISTERED

## 2025-07-10 PROCEDURE — 37000008 HC ANESTHESIA 1ST 15 MINUTES: Performed by: ORTHOPAEDIC SURGERY

## 2025-07-10 PROCEDURE — 25000003 PHARM REV CODE 250: Performed by: NURSE ANESTHETIST, CERTIFIED REGISTERED

## 2025-07-10 PROCEDURE — 71000016 HC POSTOP RECOV ADDL HR: Performed by: ORTHOPAEDIC SURGERY

## 2025-07-10 PROCEDURE — 63600175 PHARM REV CODE 636 W HCPCS: Performed by: ORTHOPAEDIC SURGERY

## 2025-07-10 PROCEDURE — 71000015 HC POSTOP RECOV 1ST HR: Performed by: ORTHOPAEDIC SURGERY

## 2025-07-10 PROCEDURE — 63600175 PHARM REV CODE 636 W HCPCS: Performed by: SPECIALIST

## 2025-07-10 PROCEDURE — 37000009 HC ANESTHESIA EA ADD 15 MINS: Performed by: ORTHOPAEDIC SURGERY

## 2025-07-10 PROCEDURE — 29875 ARTHRS KNEE SURG SYNVCT LMTD: CPT | Mod: LT,,, | Performed by: ORTHOPAEDIC SURGERY

## 2025-07-10 PROCEDURE — 63600175 PHARM REV CODE 636 W HCPCS

## 2025-07-10 PROCEDURE — 36000710: Performed by: ORTHOPAEDIC SURGERY

## 2025-07-10 PROCEDURE — 36000711: Performed by: ORTHOPAEDIC SURGERY

## 2025-07-10 PROCEDURE — 64447 NJX AA&/STRD FEMORAL NRV IMG: CPT | Performed by: SPECIALIST

## 2025-07-10 PROCEDURE — 71000033 HC RECOVERY, INTIAL HOUR: Performed by: ORTHOPAEDIC SURGERY

## 2025-07-10 RX ORDER — DIPHENHYDRAMINE HYDROCHLORIDE 50 MG/ML
25 INJECTION, SOLUTION INTRAMUSCULAR; INTRAVENOUS ONCE AS NEEDED
Status: DISCONTINUED | OUTPATIENT
Start: 2025-07-10 | End: 2025-07-10 | Stop reason: HOSPADM

## 2025-07-10 RX ORDER — ROPIVACAINE HYDROCHLORIDE 5 MG/ML
INJECTION, SOLUTION EPIDURAL; INFILTRATION; PERINEURAL
Status: COMPLETED | OUTPATIENT
Start: 2025-07-10 | End: 2025-07-10

## 2025-07-10 RX ORDER — GLUCAGON 1 MG
1 KIT INJECTION
Status: DISCONTINUED | OUTPATIENT
Start: 2025-07-10 | End: 2025-07-10 | Stop reason: HOSPADM

## 2025-07-10 RX ORDER — ONDANSETRON HYDROCHLORIDE 2 MG/ML
INJECTION, SOLUTION INTRAVENOUS
Status: DISCONTINUED | OUTPATIENT
Start: 2025-07-10 | End: 2025-07-10

## 2025-07-10 RX ORDER — DEXAMETHASONE SODIUM PHOSPHATE 4 MG/ML
INJECTION, SOLUTION INTRA-ARTICULAR; INTRALESIONAL; INTRAMUSCULAR; INTRAVENOUS; SOFT TISSUE
Status: DISCONTINUED | OUTPATIENT
Start: 2025-07-10 | End: 2025-07-10

## 2025-07-10 RX ORDER — PHENYLEPHRINE HYDROCHLORIDE 10 MG/ML
INJECTION INTRAVENOUS
Status: DISCONTINUED | OUTPATIENT
Start: 2025-07-10 | End: 2025-07-10

## 2025-07-10 RX ORDER — KETOROLAC TROMETHAMINE 30 MG/ML
INJECTION, SOLUTION INTRAMUSCULAR; INTRAVENOUS
Status: DISCONTINUED | OUTPATIENT
Start: 2025-07-10 | End: 2025-07-10

## 2025-07-10 RX ORDER — DEXMEDETOMIDINE IN 0.9 % NACL 20 MCG/5ML
SYRINGE (ML) INTRAVENOUS
Status: DISCONTINUED | OUTPATIENT
Start: 2025-07-10 | End: 2025-07-10

## 2025-07-10 RX ORDER — GLYCOPYRROLATE 0.2 MG/ML
INJECTION INTRAMUSCULAR; INTRAVENOUS
Status: DISCONTINUED | OUTPATIENT
Start: 2025-07-10 | End: 2025-07-10

## 2025-07-10 RX ORDER — IPRATROPIUM BROMIDE AND ALBUTEROL SULFATE 2.5; .5 MG/3ML; MG/3ML
3 SOLUTION RESPIRATORY (INHALATION) ONCE AS NEEDED
Status: DISCONTINUED | OUTPATIENT
Start: 2025-07-10 | End: 2025-07-10 | Stop reason: HOSPADM

## 2025-07-10 RX ORDER — LIDOCAINE HYDROCHLORIDE 20 MG/ML
INJECTION INTRAVENOUS
Status: DISCONTINUED | OUTPATIENT
Start: 2025-07-10 | End: 2025-07-10

## 2025-07-10 RX ORDER — EPINEPHRINE 1 MG/ML
INJECTION, SOLUTION, CONCENTRATE INTRAVENOUS
Status: DISCONTINUED | OUTPATIENT
Start: 2025-07-10 | End: 2025-07-10 | Stop reason: HOSPADM

## 2025-07-10 RX ORDER — FENTANYL CITRATE 50 UG/ML
INJECTION, SOLUTION INTRAMUSCULAR; INTRAVENOUS
Status: DISCONTINUED | OUTPATIENT
Start: 2025-07-10 | End: 2025-07-10

## 2025-07-10 RX ORDER — ONDANSETRON HYDROCHLORIDE 2 MG/ML
4 INJECTION, SOLUTION INTRAVENOUS ONCE
Status: DISCONTINUED | OUTPATIENT
Start: 2025-07-10 | End: 2025-07-10 | Stop reason: HOSPADM

## 2025-07-10 RX ORDER — HYDROMORPHONE HYDROCHLORIDE 1 MG/ML
0.5 INJECTION, SOLUTION INTRAMUSCULAR; INTRAVENOUS; SUBCUTANEOUS EVERY 5 MIN PRN
Status: DISCONTINUED | OUTPATIENT
Start: 2025-07-10 | End: 2025-07-10 | Stop reason: HOSPADM

## 2025-07-10 RX ORDER — LIDOCAINE HYDROCHLORIDE 20 MG/ML
INJECTION, SOLUTION EPIDURAL; INFILTRATION; INTRACAUDAL; PERINEURAL
Status: DISCONTINUED | OUTPATIENT
Start: 2025-07-10 | End: 2025-07-10 | Stop reason: HOSPADM

## 2025-07-10 RX ORDER — SODIUM CHLORIDE, SODIUM LACTATE, POTASSIUM CHLORIDE, CALCIUM CHLORIDE 600; 310; 30; 20 MG/100ML; MG/100ML; MG/100ML; MG/100ML
INJECTION, SOLUTION INTRAVENOUS CONTINUOUS
Status: DISCONTINUED | OUTPATIENT
Start: 2025-07-10 | End: 2025-07-10 | Stop reason: HOSPADM

## 2025-07-10 RX ORDER — PROPOFOL 10 MG/ML
VIAL (ML) INTRAVENOUS
Status: DISCONTINUED | OUTPATIENT
Start: 2025-07-10 | End: 2025-07-10

## 2025-07-10 RX ORDER — SODIUM CHLORIDE 9 MG/ML
INJECTION, SOLUTION INTRAVENOUS CONTINUOUS
Status: DISCONTINUED | OUTPATIENT
Start: 2025-07-10 | End: 2025-07-10 | Stop reason: HOSPADM

## 2025-07-10 RX ORDER — DEXAMETHASONE SODIUM PHOSPHATE 4 MG/ML
INJECTION, SOLUTION INTRA-ARTICULAR; INTRALESIONAL; INTRAMUSCULAR; INTRAVENOUS; SOFT TISSUE
Status: DISCONTINUED | OUTPATIENT
Start: 2025-07-10 | End: 2025-07-10 | Stop reason: HOSPADM

## 2025-07-10 RX ORDER — CEFAZOLIN SODIUM 1 G/3ML
2 INJECTION, POWDER, FOR SOLUTION INTRAMUSCULAR; INTRAVENOUS
Status: COMPLETED | OUTPATIENT
Start: 2025-07-10 | End: 2025-07-10

## 2025-07-10 RX ORDER — MIDAZOLAM HYDROCHLORIDE 2 MG/2ML
5 INJECTION, SOLUTION INTRAMUSCULAR; INTRAVENOUS ONCE AS NEEDED
Status: COMPLETED | OUTPATIENT
Start: 2025-07-10 | End: 2025-07-10

## 2025-07-10 RX ORDER — MUPIROCIN 20 MG/G
OINTMENT TOPICAL
Status: DISCONTINUED | OUTPATIENT
Start: 2025-07-10 | End: 2025-07-10 | Stop reason: HOSPADM

## 2025-07-10 RX ORDER — HYDROMORPHONE HYDROCHLORIDE 1 MG/ML
0.2 INJECTION, SOLUTION INTRAMUSCULAR; INTRAVENOUS; SUBCUTANEOUS EVERY 5 MIN PRN
Status: DISCONTINUED | OUTPATIENT
Start: 2025-07-10 | End: 2025-07-10 | Stop reason: HOSPADM

## 2025-07-10 RX ORDER — DIPHENHYDRAMINE HYDROCHLORIDE 50 MG/ML
INJECTION, SOLUTION INTRAMUSCULAR; INTRAVENOUS
Status: DISCONTINUED | OUTPATIENT
Start: 2025-07-10 | End: 2025-07-10

## 2025-07-10 RX ORDER — OXYCODONE AND ACETAMINOPHEN 5; 325 MG/1; MG/1
2 TABLET ORAL ONCE
Status: DISCONTINUED | OUTPATIENT
Start: 2025-07-10 | End: 2025-07-10 | Stop reason: HOSPADM

## 2025-07-10 RX ORDER — PROCHLORPERAZINE EDISYLATE 5 MG/ML
5 INJECTION INTRAMUSCULAR; INTRAVENOUS ONCE AS NEEDED
Status: DISCONTINUED | OUTPATIENT
Start: 2025-07-10 | End: 2025-07-10 | Stop reason: HOSPADM

## 2025-07-10 RX ADMIN — SODIUM CHLORIDE, POTASSIUM CHLORIDE, SODIUM LACTATE AND CALCIUM CHLORIDE: 600; 310; 30; 20 INJECTION, SOLUTION INTRAVENOUS at 06:07

## 2025-07-10 RX ADMIN — DIPHENHYDRAMINE HYDROCHLORIDE 12.5 MG: 50 INJECTION INTRAMUSCULAR; INTRAVENOUS at 07:07

## 2025-07-10 RX ADMIN — FENTANYL CITRATE 25 MCG: 50 INJECTION INTRAMUSCULAR; INTRAVENOUS at 07:07

## 2025-07-10 RX ADMIN — Medication 4 MCG: at 07:07

## 2025-07-10 RX ADMIN — MIDAZOLAM HYDROCHLORIDE 4 MG: 1 INJECTION, SOLUTION INTRAMUSCULAR; INTRAVENOUS at 06:07

## 2025-07-10 RX ADMIN — GLYCOPYRROLATE 0.1 MG: 0.2 INJECTION INTRAMUSCULAR; INTRAVENOUS at 07:07

## 2025-07-10 RX ADMIN — KETOROLAC TROMETHAMINE 30 MG: 30 INJECTION INTRAMUSCULAR; INTRAVENOUS at 07:07

## 2025-07-10 RX ADMIN — ONDANSETRON 4 MG: 2 INJECTION INTRAMUSCULAR; INTRAVENOUS at 07:07

## 2025-07-10 RX ADMIN — LIDOCAINE HYDROCHLORIDE 75 MG: 20 INJECTION INTRAVENOUS at 07:07

## 2025-07-10 RX ADMIN — ROPIVACAINE HYDROCHLORIDE 20 ML: 5 INJECTION, SOLUTION EPIDURAL; INFILTRATION; PERINEURAL at 06:07

## 2025-07-10 RX ADMIN — PHENYLEPHRINE HYDROCHLORIDE 100 MCG: 10 INJECTION INTRAVENOUS at 07:07

## 2025-07-10 RX ADMIN — FENTANYL CITRATE 50 MCG: 50 INJECTION INTRAMUSCULAR; INTRAVENOUS at 07:07

## 2025-07-10 RX ADMIN — PROPOFOL 140 MG: 10 INJECTION, EMULSION INTRAVENOUS at 07:07

## 2025-07-10 RX ADMIN — CEFAZOLIN 2 G: 330 INJECTION, POWDER, FOR SOLUTION INTRAMUSCULAR; INTRAVENOUS at 07:07

## 2025-07-10 RX ADMIN — DEXAMETHASONE SODIUM PHOSPHATE 8 MG: 4 INJECTION, SOLUTION INTRA-ARTICULAR; INTRALESIONAL; INTRAMUSCULAR; INTRAVENOUS; SOFT TISSUE at 07:07

## 2025-07-10 NOTE — ANESTHESIA PROCEDURE NOTES
Intubation    Date/Time: 7/10/2025 7:11 AM    Performed by: Rachelle Perez CRNA  Authorized by: Tyra Banks MD    Intubation:     Induction:  Intravenous    Intubated:  Postinduction    Mask Ventilation:  Not attempted    Attempts:  1    Attempted By:  SERENA and student (Rafaela Celestin)    Difficult Airway Encountered?: No      Complications:  None    Airway Device:  Supraglottic airway/LMA    Airway Device Size:  4.0    Style/Cuff Inflation:  Uncuffed    Placement Verified By:  Capnometry    Complicating Factors:  None    Findings Post-Intubation:  BS equal bilateral and atraumatic/condition of teeth unchanged

## 2025-07-10 NOTE — DISCHARGE SUMMARY
[facility]  Discharge Note  Short Stay    Procedure(s) (LRB):  ARTHROSCOPY, KNEE, DIAGNOSTIC (Left)  ARTHROSCOPY, KNEE, W/ PLICA EXCISION (Left)      OUTCOME: Patient tolerated treatment/procedure well without complication and is now ready for discharge.    DISPOSITION: Home or Self Care    FINAL DIAGNOSIS:  <principal problem not specified>    FOLLOWUP: In clinic    DISCHARGE INSTRUCTIONS:  No discharge procedures on file.     TIME SPENT ON DISCHARGE: 6 minutes

## 2025-07-10 NOTE — TRANSFER OF CARE
Anesthesia Transfer of Care Note    Patient: Karissa Higgins    Procedure(s) Performed: Procedure(s) (LRB):  ARTHROSCOPY, KNEE, DIAGNOSTIC (Left)  ARTHROSCOPY, KNEE, W/ PLICA EXCISION (Left)    Patient location: PACU    Anesthesia Type: general    Transport from OR: Transported from OR on room air with adequate spontaneous ventilation    Post pain: adequate analgesia    Post assessment: no apparent anesthetic complications    Post vital signs: stable    Nausea/Vomiting: no nausea/vomiting    Complications: none    Transfer of care protocol was followed      Last vitals: Visit Vitals  BP (!) 137/96 (BP Location: Left arm, Patient Position: Lying)   Pulse 87   Temp 36.6 °C (97.9 °F) (Axillary)   Resp 10   Wt 47.7 kg (105 lb 3.2 oz)   SpO2 100%   Breastfeeding No   BMI 18.64 kg/m²

## 2025-07-10 NOTE — ANESTHESIA PROCEDURE NOTES
Intubation    Performed by: Rafaela Celestin  Authorized by: Rafaela Celestin    Intubation:     Induction:  Intravenous    Intubated:  Postinduction    Mask Ventilation:  Easy mask    Attempts:  1    Attempted By:  Student and CRNA    Difficult Airway Encountered?: No      Complications:  None    Airway Device:  Supraglottic airway/LMA    Airway Device Size:  4.0    Style/Cuff Inflation:  Uncuffed    Placement Verified By:  Capnometry    Complicating Factors:  None    Findings Post-Intubation:  BS equal bilateral and atraumatic/condition of teeth unchanged

## 2025-07-10 NOTE — LETTER
July 10, 2025         [address] [address] [facility] [address]  Phone: [phone]  Fax: [phone]       Patient: Karissa Higgins   YOB: 1995  Date of Visit: 07/10/2025    To Whom It May Concern:    Julio Higgins  was at [facility] on 07/10/2025. The patient may return to work on Friday, July 18, 2025 with no restrictions. If you have any questions or concerns, or if I can be of further assistance, please do not hesitate to contact me.    Sincerely,    Nura Hough RN

## 2025-07-10 NOTE — ANESTHESIA POSTPROCEDURE EVALUATION
Anesthesia Post Evaluation    Patient: Karissa Higgins    Procedure(s) Performed: Procedure(s) (LRB):  ARTHROSCOPY, KNEE, DIAGNOSTIC (Left)  ARTHROSCOPY, KNEE, W/ PLICA EXCISION (Left)    Final Anesthesia Type: general      Patient location during evaluation: PACU  Patient participation: Yes- Able to Participate  Level of consciousness: awake and responds to stimulation  Post-procedure vital signs: reviewed and stable  Pain management: adequate  Airway patency: patent  PJ mitigation strategies: Use of major conduction anesthesia (spinal/epidural) or peripheral nerve block  PONV status at discharge: No PONV  Anesthetic complications: no      Cardiovascular status: blood pressure returned to baseline  Respiratory status: unassisted  Hydration status: euvolemic  Follow-up not needed.          Vitals Value Taken Time   /81 07/10/25 09:15   Temp 36.4 °C (97.5 °F) 07/10/25 08:30   Pulse 62 07/10/25 09:30   Resp 18 07/10/25 09:15   SpO2 99 % 07/10/25 09:30   Vitals shown include unfiled device data.      Event Time   Out of Recovery 08:30:00         Pain/Jasbir Score: Jasbir Score: 8 (7/10/2025  8:34 AM)

## 2025-07-10 NOTE — ANESTHESIA PROCEDURE NOTES
Peripheral Block    Patient location during procedure: pre-op   Block not for primary anesthetic.  Reason for block: at surgeon's request and post-op pain management   Post-op Pain Location: Left knee   Start time: 7/10/2025 6:51 AM  Timeout: 7/10/2025 6:50 AM   End time: 7/10/2025 6:55 AM    Staffing  Authorizing Provider: Tyra Banks MD  Performing Provider: Tyra Banks MD    Staffing  Performed by: Tyra Banks MD  Authorized by: Tyra Banks MD    Preanesthetic Checklist  Completed: patient identified, IV checked, site marked, risks and benefits discussed, surgical consent, monitors and equipment checked, pre-op evaluation and timeout performed  Peripheral Block  Patient position: supine  Prep: ChloraPrep  Patient monitoring: heart rate, cardiac monitor, continuous pulse ox, continuous capnometry and frequent blood pressure checks  Block type: adductor canal  Laterality: left  Injection technique: single shot  Needle  Needle type: Stimuplex   Needle gauge: 21 G  Needle length: 4 in  Needle localization: anatomical landmarks and ultrasound guidance   -ultrasound image captured on disc.  Assessment  Injection assessment: negative aspiration, negative parasthesia and local visualized surrounding nerve  Paresthesia pain: none  Heart rate change: no  Slow fractionated injection: yes    Medications:    Medications: ropivacaine (NAROPIN) injection 0.5% - Perineural   20 mL - 7/10/2025 6:53:00 AM    Additional Notes  VSS.  [facility] RN monitoring vitals throughout procedure.  Patient tolerated procedure well.

## 2025-07-10 NOTE — BRIEF OP NOTE
[facility]  Brief Operative Note    Surgery Date: 7/10/2025     Surgeons and Role:     * Payam Gilbert MD - Primary    Assisting Surgeon: None    Pre-op Diagnosis:  * No pre-op diagnosis entered *    Post-op Diagnosis:  Post-Op Diagnosis Codes:     * Left knee pain, unspecified chronicity [M25.562]    Procedure(s) (LRB):  ARTHROSCOPY, KNEE, DIAGNOSTIC (Left)  ARTHROSCOPY, KNEE, W/ PLICA EXCISION (Left)    Anesthesia: General    Operative Findings: see op note    Estimated Blood Loss: * No values recorded between 7/10/2025  7:05 AM and 7/10/2025  8:09 AM *         Specimens:   Specimen (24h ago, onward)      None            * No specimens in log *        Discharge Note    OUTCOME: Patient tolerated treatment/procedure well without complication and is now ready for discharge.    DISPOSITION: Home or Self Care    FINAL DIAGNOSIS:  <principal problem not specified>    FOLLOWUP: In clinic    DISCHARGE INSTRUCTIONS:  No discharge procedures on file.

## 2025-07-10 NOTE — H&P
[facility] Interval H&P    The patient has been personally evaluated by me. They verbally confirmed they will undergo LEFT knee arthroscopy, possible medial/lateral meniscectomy, possible chondroplasty, possible plica excision with Dr. Gilbert. There have been no changes in patient's health since the last time they were seen.    - Consent signed  - Patient marked  - NPO since midnight  - All patient questions answered    Vitaliy Ramos MD, ATC  [facility], PGY-3    7/10/2025 6:00 AM    [facility]  Established Patient Office Visit  06/04/2025         Patient ID: Karissa Higgins  YOB: 1995  MRN: [id number]     Chief Complaint: No chief complaint on file.     HPI:  Karissa Higgins is a 30 y.o. female who sustained a left knee injury in July of 2024 while rough playing. She reports feeling a popping sensation at the time of injury and was unable to ambulate for 3 weeks afterwards. MRI of the left knee without evidence of ACL/PCL or meniscal pathology.  Patient has been in physical therapy since August 2024 and reports minimal improvement in her symptoms. She says the pain is located deep in the front of the knee. At the last appointment we discussed the possibility of future knee scope if her pain did not improve with the corticosteroid injection 02/26/25.     Patient states that today she has persistent left knee pain.  She says she has continued pain and giving out of the knee causing her to fall few weeks ago.  She says that the injection did give her significant relief for almost a month however it returned back to its baseline level.  Patient also mentioned that she does occasionally have some left hip pain and we have not recently gotten any left hip films which we will do today.     Past Medical History:     No past medical history on file.        Past Surgical History:   Procedure Laterality Date    ADENOIDECTOMY        HYSTERECTOMY        TONSILLECTOMY                  Family History   Problem Relation Name Age of Onset    Arthritis Maternal Grandfather Fahad mcgregor      COPD Maternal Grandfather Fahad mcgregor      Early death Maternal Grandfather Fahad mcgregor      Heart disease Maternal Grandfather Fahad mcgregor      Arthritis Maternal Grandmother Verónica balbir      Cancer Maternal Grandmother Verónica balbir      Depression Maternal Grandmother Verónica balbir      Early death Maternal Grandmother Verónica balbir      Stroke Maternal Grandmother Verónica balbir        Social History            Socioeconomic History    Marital status: Single   Tobacco Use    Smoking status: Never    Smokeless tobacco: Never   Substance and Sexual Activity    Alcohol use: Never    Drug use: Never    Sexual activity: Yes       Partners: Male       Birth control/protection: See Surgical Hx, None           Medication List with Changes/Refills   Current Medications     DICLOFENAC SODIUM (VOLTAREN) 1 % GEL    Apply 4 g topically 3 (three) times daily.           Review of patient's allergies indicates:   Allergen Reactions    Clindamycin Rash    Penicillins Rash    Vancomycin analogues Rash    Zosyn in dextrose (iso-osm) [piperacillin-tazobactam-dextrs] Rash         Physical Exam:  Left Lower extremity:  Skin atraumatic   Minimal effusion about knee  Tenderness to palpation medial and lateral joint line  ROM see address 130° of flexion  Stable anterior/posterior  Lachman 1A, equivalent to contralateral  Stable varus valgus stress  No patellar apprehension   No significant laxity with patellar translation  Luis Fernando's equivocal  Negative pivot shift  No pain with NATE  Patient has lateral hip pain with FADIR  Tender to palpation over the greater trochanter  Motor intact TA/GS/EHL/FHL  SILT SP/DP/Sa/Pratt/T  Toes WWP     Imaging independently interpreted:  Previous MRI 2024 of the left knee demonstrates no evidence of ACL or PCL tear.  Meniscus intact.  No significant cartilage wear.   Minimal attenuation medial femoral condyle.     X-ray of the left hip does not demonstrate any significant osteoarthritis and she may have a small cam lesion however this is minimal     Assessment and Plan:  Karissa Higgins is a 30 y.o. female continued left knee pain following left knee injury in July of 2024 with pain consistent with patellofemoral pain syndrome.  Patient of the doing physical therapy for many months without much relief.  No evidence of distinct injury on MRI but continues to have pain after therapy and steroid injection.  This has been going on for almost a year.  Patient likely has concomitant greater trochanteric bursitis.     - we will continue to move forward with operative treatment of her knee with a left knee arthroscopy; it is possible she has a missed chondral lesion or a plica that is causing her persistent pain  - patient has been booked in clinic today  - activities as tolerated     Keon Andres MD PGY-3  [employer] [facility]

## 2025-07-11 NOTE — OP NOTE
[facility] [place] [facility]    Operative Note    Date of Service: 7/10/2025    Pre-Operative Diagnosis:   Left knee pain    Post-Operative Diagnosis: Same    Procedure(s):   1. Left knee diagnostic arthroscopy with plica exicision     Anesthesia: General     Surgeon: Payam Gilbert MD, was present and scrubbed for the key portions of the procedure.     Assistant(s):  Renard Jack MD    Indications  Patient is a 30 y.o.female with chronic left knee pain for 1 year.  She was rough playing in July of 2024 when she felt a pop in her left knee and was unable to ambulate for 3 weeks afterwards.  An MRI was ordered which only showed possible chondral thinning over the medial femoral condyle.  She performed multiple courses of physical therapy.  She has tried both patella stabilizing braces and hinged knee braces.  She has taken over-the-counter medications.  She tried a knee injection.  None of these provided lasting relief of her symptoms.  We offered her a diagnostic arthroscopy which she consented to.  The patient was checked again in the Holding Room. The risks, benefits, complications, treatment options, and expected outcomes were reviewed again with the patient. The patient has elected to proceed with above listed procedure(s).  The risks and potential complications include but are not limited to infection, nerve injury, vascular injury, persistent pain, potential skin necrosis, deep vein thrombosis, possible pulmonary embolus, complications of the anesthetics and failure of the implants with potential need for future surgery to remove or revise.  The patient concurred with the proposed plan, giving informed consent.  The site of surgery was identified by the patient and properly noted/marked by me.  On physical exam the morning of surgery, she significant pain both over the medial knee joint line and the pes anserinus.  We discussed performing a local steroid injection as part of the procedure.    Procedure Details:    The patient was taken to the Operating Room.  A Time-Out was held and the patient, location and procedure(s) were verified and agreed upon by all members of the operating room staff.  Prophylacic antibiotics were given.The patient was given general anesthesia.     Following the successful induction of anesthesia the patient was placed supine. The left lower extremity was prepped and draped in normal sterile manner.  A tourniquet was inflated to 250 mmHg for a total of 21 minutes.     A diagnostic arthroscopy was performed.  A superolateral inflow portal was created.  An anterolateral portal was created.  The knee was entered.  There were no loose bodies in the suprapatellar pouch, lateral gutter, or medial gutter.  There were grade 2 cartilaginous changes over the medial patellar facet.  There were no chondral changes on the lateral patellar facet or the trochlea.  The patella articulated well in the trochlea.  There was a large medial plica extending from the top of the patella nearly going down into the joint. We then created an anteromedial portal under direct visualization using a spinal needle.  A probe was inserted. The medial compartment was visualized under valgus stress.  The medial and all had no cartilaginous changes.  There was a small focal area of nonweightbearing tibia that had a full-thickness chondral defect.  There was a small defect of the anterior medial meniscal body without free edges and without compromising the integrity of the meniscus.  This was probed extensively. The notch was then evaluated.  The ACL and PCL were intact.  The lateral compartment was visualized under finger for stress.  The meniscus was well fixed to the periphery.  There were no chondral changes.  A shaver was introduced into the knee.  The medial plica was removed in his entirety.  After resection, there was no soft tissue joint extruding into the patellofemoral joint.    The knee was drained.  The portals were closed  with 4-0 Rapide suture.  1 mg of Decadron with 1% lidocaine without epinephrine was injected into the pes anserinus.  3 mg of Decadron with 1% lidocaine without epinephrine was injected into the knee joint.    A dry sterile dressing was applied using Adaptic, sterile gauze, cast padding and loosely wrapped ace wrap.     Instrument, sponge, and needle counts were correct prior to wound closure and at the conclusion of the case.     The patient was awoken from anesthesia, tolerated the procedure well and taken to recovery in stable condition.       Findings:   1.  See op note    Estimated blood loss: 5cc    Drains: no drain    Total IV fluids: per anesthesia records    Specimens:   1.  none    Complications: None, pt tolerated the procedure well    Disposition: Awakened from anesthesia, and taken to the recovery room in a stable condition, having suffered no apparent untoward event     Condition: Stable     Post-Operative Management  WBAT  Sutures will fall out  Pain medications  Follow up 2 weeks    Discharge Medications:    Pain medication      Renard Jack MD, MD  [facility]

## 2025-07-14 ENCOUNTER — TELEPHONE (OUTPATIENT)
Dept: ORTHOPEDICS | Facility: CLINIC | Age: 30
End: 2025-07-14
Payer: MEDICAID

## 2025-07-14 NOTE — TELEPHONE ENCOUNTER
Patient called with concerns with swelling to left foot s/p left knee procedure 7/10/25, states that she has been bearing weight as tolerated, advised to elevated left leg and apply ice as needed, no c/o pain mainly concerned about swelling to left foot, advised that she can apply a smaller ace wrap to knee, advised to call clinic if needed.

## 2025-07-23 ENCOUNTER — OFFICE VISIT (OUTPATIENT)
Dept: ORTHOPEDICS | Facility: CLINIC | Age: 30
End: 2025-07-23
Payer: MEDICAID

## 2025-07-23 VITALS
OXYGEN SATURATION: 99 % | RESPIRATION RATE: 20 BRPM | HEART RATE: 77 BPM | DIASTOLIC BLOOD PRESSURE: 75 MMHG | HEIGHT: 63 IN | TEMPERATURE: 98 F | WEIGHT: 107.81 LBS | SYSTOLIC BLOOD PRESSURE: 121 MMHG | BODY MASS INDEX: 19.1 KG/M2

## 2025-07-23 DIAGNOSIS — M23.92 ACUTE INTERNAL DERANGEMENT OF LEFT KNEE: Primary | ICD-10-CM

## 2025-07-23 PROCEDURE — 99213 OFFICE O/P EST LOW 20 MIN: CPT | Mod: PBBFAC

## 2025-07-23 RX ORDER — MELOXICAM 15 MG/1
15 TABLET ORAL DAILY
Qty: 30 TABLET | Refills: 1 | Status: SHIPPED | OUTPATIENT
Start: 2025-07-23 | End: 2025-09-21

## 2025-07-23 RX ORDER — OMEPRAZOLE 40 MG/1
40 CAPSULE, DELAYED RELEASE ORAL
COMMUNITY
Start: 2025-07-17

## 2025-07-23 NOTE — PROGRESS NOTES
[facility] Progress Note     In brief, Karissa Higgins is a 30 y.o. female who sustained a left knee injury in July of 2024 while rough playing and underwent L knee arthroscopy with medial plica excision (DOS: 7/10/25).  She presents today for postoperative follow-up.    HPI:  The patient reports that she has moderate pain in the left knee postoperatively, which is primarily located along the anterior aspect of the knee.  She also does have some swelling, which has been causing her pain along the posterior aspect of the knee.  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  She does occasionally use some ice.  She has continues to use meloxicam for an anti-inflammatory effect.  She has not yet begun physical therapy.    PMH: No past medical history on file.    PSH:   Past Surgical History:   Procedure Laterality Date    ADENOIDECTOMY      ARTHROSCOPY OF KNEE Left 7/10/2025    Procedure: ARTHROSCOPY, KNEE, DIAGNOSTIC;  Surgeon: Payam Gilbert MD;  Location: [facility];  Service: Orthopedics;  Laterality: Left;    HYSTERECTOMY      KNEE ARTHROSCOPY W/ PLICA EXCISION Left 7/10/2025    Procedure: ARTHROSCOPY, KNEE, W/ PLICA EXCISION;  Surgeon: Payam Gilbert MD;  Location: [facility];  Service: Orthopedics;  Laterality: Left;    TONSILLECTOMY         SH: Social History[1]    FH:   Family History   Problem Relation Name Age of Onset    Arthritis Maternal Grandfather Fahad higgins     COPD Maternal Grandfather Fahad higgins     Early death Maternal Grandfather Fhaad higgins     Heart disease Maternal Grandfather Fahad higgins     Arthritis Maternal Grandmother Verónica balbir     Cancer Maternal Grandmother Verónica balbir     Depression Maternal Grandmother Verónica balbir     Early death Maternal Grandmother Verónica balbir     Stroke Maternal Grandmother Verónica balbir        Allergies:   Review of patient's allergies indicates:   Allergen Reactions    Opioids  - morphine analogues      Causes agitation    Clindamycin Rash    Penicillins Rash    Vancomycin analogues Rash    Zosyn in dextrose (iso-osm) [piperacillin-tazobactam-dextrs] Rash     Physical Exam:    There were no vitals filed for this visit.    General: NAD, AAOx3    MSK Left Knee  Incisions healing well with no surrounding erythema or drainage  Mild knee effusion noted, some ecchymoses on anterior knee  Positive patellar apprehension test  Mild TTP on the medial and lateral joint line  Knee ROM 0 - 110  Negative Lachman's test   Negative anterior and posterior drawer test   Negative varus and valgus stress test at 0 and 30°   BARTOLO T SA/SD/SP/DP/T   Motor intact to EHL/FHL/GSC/TA   DP 2+    Imaging:  No new imaging obtained during today's visit     Assessment/Plan: Karissa Higgins is a 30 y.o. female who sustained a left knee injury in July of 2024 while rough playing and underwent L knee arthroscopy with medial plica excision (DOS: 7/10/25).  She presents today for her 1st postoperative follow-up and has moderate pain in the left knee, reduced ROM, and reduced strength in the L knee.     - WBAT LLE  - PT referral provided for L knee ROM/STR exercises, specifically focusing on activating the quadriceps/hamstring muscles  - Refill for Mobic provided  - Follow-up in 6 weeks for clinical re-evaluation     Vitaliy Ramos MD, ATC  [facility], PGY-3    7/23/2025 9:06 AM       [1]   Social History  Socioeconomic History    Marital status: Single   Tobacco Use    Smoking status: Never    Smokeless tobacco: Never   Substance and Sexual Activity    Alcohol use: Never    Drug use: Never    Sexual activity: Yes     Partners: Male     Birth control/protection: See Surgical Hx, None

## 2025-07-23 NOTE — PROGRESS NOTES
Faculty Attestation: Karissa Higgins  was seen at [facility] in the Orthopaedic Clinic in the outpatient department at a [facility]. Discussed with resident at time of visit. I agree the resident's assessment and plan  I participated in the management of the patient and was immediately available throughout the encounter. History of Present Illness, Physical Exam, and Assessment and Plan reviewed. Treatment plan is reasonable and appropriate. Compliance with treatment recommendations is important. No procedures were performed.     Payam Gilbert MD  [facility] Orthopedic Surgery Chief

## 2025-08-21 ENCOUNTER — TELEPHONE (OUTPATIENT)
Dept: ORTHOPEDICS | Facility: CLINIC | Age: 30
End: 2025-08-21
Payer: MEDICAID

## 2025-08-22 RX ORDER — GABAPENTIN 300 MG/1
600 CAPSULE ORAL NIGHTLY
Qty: 60 CAPSULE | Refills: 11 | Status: SHIPPED | OUTPATIENT
Start: 2025-08-22 | End: 2026-08-22

## 2025-08-22 RX ORDER — MELOXICAM 15 MG/1
15 TABLET ORAL DAILY
Qty: 30 TABLET | Refills: 1 | Status: SHIPPED | OUTPATIENT
Start: 2025-08-22 | End: 2025-10-21

## (undated) DEVICE — BLADE SHAVER LANZA 4.2X13CM

## (undated) DEVICE — CUFF TOURNIQUET STER DIS 34

## (undated) DEVICE — DRESSING GAUZE XEROFORM 5X9

## (undated) DEVICE — ALCOHOL 70% ISO RUBBING 16OZ

## (undated) DEVICE — MANIFOLD 4 PORT

## (undated) DEVICE — KIT BASIC ORTHO UNIVERSITY

## (undated) DEVICE — PACK SURGICAL KNEE SCOPE

## (undated) DEVICE — DRAPE U STD FILM LG 60X84IN

## (undated) DEVICE — GLOVE SIGNATURE MICRO LTX 7

## (undated) DEVICE — GLOVE SIGNATURE MICRO LTX 8

## (undated) DEVICE — SUT VICRYL RAPIDE 4-0 18 P

## (undated) DEVICE — GLOVE SENSICARE PI GRN 8

## (undated) DEVICE — APPLICATOR CHLRAPRP 26ML

## (undated) DEVICE — TUBE SET INFLOW/OUTFLOW

## (undated) DEVICE — POSITIONER HEAD SUPINE PINK

## (undated) DEVICE — GLOVE SENSICARE PI GRN 7.5

## (undated) DEVICE — MAT SURGICAL ECOSUCTIONER

## (undated) DEVICE — SOL NACL IRR 3000ML